# Patient Record
Sex: FEMALE | Race: WHITE | NOT HISPANIC OR LATINO | URBAN - NONMETROPOLITAN AREA
[De-identification: names, ages, dates, MRNs, and addresses within clinical notes are randomized per-mention and may not be internally consistent; named-entity substitution may affect disease eponyms.]

---

## 2018-06-07 ENCOUNTER — APPOINTMENT (RX ONLY)
Dept: URBAN - NONMETROPOLITAN AREA CLINIC 13 | Facility: CLINIC | Age: 28
Setting detail: DERMATOLOGY
End: 2018-06-07

## 2019-01-10 ENCOUNTER — RX ONLY (OUTPATIENT)
Age: 29
Setting detail: RX ONLY
End: 2019-01-10

## 2019-01-10 RX ORDER — BIMATOPROST 0.03 %
AAA DROPS, WITH APPLICATOR (ML) TOPICAL DAILY
Qty: 1 | Refills: 3 | COMMUNITY
Start: 2019-01-10

## 2019-01-31 ENCOUNTER — RX ONLY (OUTPATIENT)
Age: 29
Setting detail: RX ONLY
End: 2019-01-31

## 2019-01-31 RX ORDER — AZITHROMYCIN DIHYDRATE 250 MG/1
2/1 TABLET TABLET, FILM COATED ORAL DAILY
Qty: 6 | Refills: 0 | Status: ERX | COMMUNITY
Start: 2019-01-31

## 2019-02-04 ENCOUNTER — RX ONLY (OUTPATIENT)
Age: 29
Setting detail: RX ONLY
End: 2019-02-04

## 2019-02-04 RX ORDER — AZITHROMYCIN DIHYDRATE 250 MG/1
2/1 TABLET TABLET, FILM COATED ORAL DAILY
Qty: 6 | Refills: 0 | Status: ERX

## 2019-02-05 ENCOUNTER — RX ONLY (OUTPATIENT)
Age: 29
Setting detail: RX ONLY
End: 2019-02-05

## 2019-02-05 RX ORDER — VALACYCLOVIR 1 G/1
1 TABLET ORAL
Qty: 10 | Refills: 1 | Status: ERX | COMMUNITY
Start: 2019-02-05

## 2019-02-14 ENCOUNTER — APPOINTMENT (RX ONLY)
Dept: URBAN - NONMETROPOLITAN AREA CLINIC 13 | Facility: CLINIC | Age: 29
Setting detail: DERMATOLOGY
End: 2019-02-14

## 2019-02-14 DIAGNOSIS — J02.0 STREPTOCOCCAL PHARYNGITIS: ICD-10-CM

## 2019-02-14 PROCEDURE — ? ORDER TESTS

## 2019-02-14 PROCEDURE — ? ADDITIONAL NOTES

## 2019-02-14 PROCEDURE — 99212 OFFICE O/P EST SF 10 MIN: CPT

## 2019-02-14 NOTE — PROCEDURE: ADDITIONAL NOTES
Detail Level: Simple
Additional Notes: History of having Strep in the past and does have a child at home and worried about her getting it.   Will treat pending culture

## 2019-08-28 ENCOUNTER — RX ONLY (OUTPATIENT)
Age: 29
Setting detail: RX ONLY
End: 2019-08-28

## 2019-08-28 RX ORDER — VALACYCLOVIR 1 G/1
1 TABLET ORAL
Qty: 10 | Refills: 1 | Status: ERX

## 2020-05-11 ENCOUNTER — RX ONLY (OUTPATIENT)
Age: 30
Setting detail: RX ONLY
End: 2020-05-11

## 2020-05-13 ENCOUNTER — RX ONLY (OUTPATIENT)
Age: 30
Setting detail: RX ONLY
End: 2020-05-13

## 2020-09-18 ENCOUNTER — RX ONLY (OUTPATIENT)
Age: 30
Setting detail: RX ONLY
End: 2020-09-18

## 2020-09-18 RX ORDER — AZITHROMYCIN DIHYDRATE 250 MG/1
2/1 TABLET TABLET, FILM COATED ORAL DAILY
Qty: 6 | Refills: 0 | Status: ERX | COMMUNITY
Start: 2020-09-18

## 2020-10-13 ENCOUNTER — RX ONLY (OUTPATIENT)
Age: 30
Setting detail: RX ONLY
End: 2020-10-13

## 2020-10-13 RX ORDER — VALACYCLOVIR 1 G/1
1 TABLET, FILM COATED ORAL
Qty: 90 | Refills: 1 | Status: ERX

## 2021-02-24 ENCOUNTER — RX ONLY (OUTPATIENT)
Age: 31
Setting detail: RX ONLY
End: 2021-02-24

## 2021-02-24 RX ORDER — BIMATOPROST 0.3 MG/ML
1 SOLUTION/ DROPS OPHTHALMIC QD
Qty: 1 | Refills: 11 | Status: CANCELLED
Stop reason: ENTERED-IN-ERROR

## 2021-10-18 ENCOUNTER — RX ONLY (OUTPATIENT)
Age: 31
Setting detail: RX ONLY
End: 2021-10-18

## 2021-10-18 RX ORDER — AZITHROMYCIN DIHYDRATE 250 MG/1
2/1 TABLET TABLET, FILM COATED ORAL DAILY
Qty: 6 | Refills: 1 | Status: ERX | COMMUNITY
Start: 2021-10-18

## 2023-03-14 ENCOUNTER — RX ONLY (OUTPATIENT)
Age: 33
Setting detail: RX ONLY
End: 2023-03-14

## 2023-03-14 RX ORDER — AZITHROMYCIN DIHYDRATE 250 MG/1
2/1 TABLET TABLET, FILM COATED ORAL DAILY
Qty: 6 | Refills: 1 | Status: ERX